# Patient Record
Sex: MALE | Race: OTHER | Employment: STUDENT | URBAN - NONMETROPOLITAN AREA
[De-identification: names, ages, dates, MRNs, and addresses within clinical notes are randomized per-mention and may not be internally consistent; named-entity substitution may affect disease eponyms.]

---

## 2022-09-05 ENCOUNTER — HOSPITAL ENCOUNTER (EMERGENCY)
Age: 22
Discharge: HOME | End: 2022-09-05
Payer: COMMERCIAL

## 2022-09-05 VITALS
HEART RATE: 85 BPM | WEIGHT: 130 LBS | RESPIRATION RATE: 14 BRPM | TEMPERATURE: 97.8 F | HEIGHT: 68 IN | OXYGEN SATURATION: 99 % | DIASTOLIC BLOOD PRESSURE: 82 MMHG | BODY MASS INDEX: 19.7 KG/M2 | SYSTOLIC BLOOD PRESSURE: 134 MMHG

## 2022-09-05 DIAGNOSIS — Z48.02 VISIT FOR SUTURE REMOVAL: Primary | ICD-10-CM

## 2022-09-05 PROCEDURE — 99203 OFFICE O/P NEW LOW 30 MIN: CPT

## 2022-09-05 PROCEDURE — 99202 OFFICE O/P NEW SF 15 MIN: CPT

## 2022-09-05 PROCEDURE — 99203 OFFICE O/P NEW LOW 30 MIN: CPT | Performed by: NURSE PRACTITIONER

## 2022-09-05 RX ORDER — ATOMOXETINE 40 MG/1
40 CAPSULE ORAL DAILY
COMMUNITY

## 2022-09-05 ASSESSMENT — PAIN SCALES - GENERAL: PAINLEVEL_OUTOF10: 1

## 2022-09-05 ASSESSMENT — ENCOUNTER SYMPTOMS
NAUSEA: 0
SHORTNESS OF BREATH: 0
VOMITING: 0
COUGH: 0
SORE THROAT: 0

## 2022-09-05 ASSESSMENT — PAIN DESCRIPTION - LOCATION: LOCATION: FINGER (COMMENT WHICH ONE)

## 2022-09-05 ASSESSMENT — PAIN - FUNCTIONAL ASSESSMENT: PAIN_FUNCTIONAL_ASSESSMENT: 0-10

## 2022-09-05 NOTE — ED TRIAGE NOTES
TO room 7 req Uest suture removel right ring finger.   Area scabbed, well approximated without redness or drainage

## 2022-09-05 NOTE — ED PROVIDER NOTES
[facility]       CHIEF COMPLAINT       Chief Complaint   Patient presents with    Suture / Staple Removal     Right ring finger- area scabbed hard to visualize  pt states td was not updated and unsure last td  Sutures placed last Monday [facility]       Nurses Notes reviewed and I agree except as noted in the HPI. HISTORY OF PRESENT ILLNESS   Jonah Harry is a 25 y.o. male who presents for removal of sutures to the right ring finger. Patient states that he had an accident while at work 1 week ago and was taken to [facility] and had sutures placed. He states that he did not file any Worker's Compensation for this. He denies any issues or concerns with the sutures other than scabs forming over the knots. Denies any fevers, chills, discharge or drainage. The history is provided by the patient. REVIEW OF SYSTEMS     Review of Systems   Constitutional:  Negative for chills and fever. HENT:  Negative for congestion and sore throat. Respiratory:  Negative for cough and shortness of breath. Cardiovascular:  Negative for chest pain. Gastrointestinal:  Negative for nausea and vomiting. Musculoskeletal:  Negative for arthralgias and joint swelling. Skin:  Positive for wound. Neurological:  Negative for headaches. PAST MEDICAL HISTORY         Diagnosis Date    ADHD        SURGICALHISTORY     Patient  has a past surgical history that includes Cosmetic surgery. CURRENT MEDICATIONS       Previous Medications    ATOMOXETINE (STRATTERA) 40 MG CAPSULE    Take 40 mg by mouth daily       ALLERGIES     Patient is has No Known Allergies. Patients   There is no immunization history on file for this patient. FAMILY HISTORY     Patient's family history is not on file. SOCIAL HISTORY     Patient  reports that he does not currently use alcohol. He reports that he does not currently use drugs.     PHYSICAL EXAM     ED TRIAGE VITALS  BP: 134/82, Temp: 97.8 °F (36.6 °C), Heart Rate: 85, Resp: 14, SpO2: 99 %,Estimated body mass index is 19.77 kg/m² as calculated from the following:    Height as of this encounter: 5' 8\" (1.727 m). Weight as of this encounter: 130 lb (59 kg). ,No LMP for male patient. Physical Exam  Vitals and nursing note reviewed. Constitutional:       General: He is not in acute distress. Appearance: He is well-developed. He is not diaphoretic. Eyes:      Conjunctiva/sclera:      Right eye: Right conjunctiva is not injected. Left eye: Left conjunctiva is not injected. Pupils: Pupils are equal.   Cardiovascular:      Rate and Rhythm: Normal rate and regular rhythm. Heart sounds: No murmur heard. Pulmonary:      Effort: Pulmonary effort is normal. No respiratory distress. Breath sounds: Normal breath sounds. Musculoskeletal:      Right hand: Laceration (resolved) present. Normal sensation. Normal capillary refill. Cervical back: Normal range of motion. Comments: Laceration with 5 sutures in place noted to the distal aspect of the right ring finger with scabbing over the wounds noted. There is no sign of redness or drainage. Skin:     General: Skin is warm. Findings: No rash. Neurological:      Mental Status: He is alert and oriented to person, place, and time. Psychiatric:         Behavior: Behavior normal.       DIAGNOSTIC RESULTS     Labs:No results found for this visit on 09/05/22. IMAGING:    No orders to display         EKG:      URGENT CARE COURSE:     Vitals:    09/05/22 1222   BP: 134/82   Pulse: 85   Resp: 14   Temp: 97.8 °F (36.6 °C)   SpO2: 99%   Weight: 130 lb (59 kg)   Height: 5' 8\" (1.727 m)       Medications - No data to display         PROCEDURES:  Suture removal kit was used to remove 5 sutures from the distal right ring finger. FINAL IMPRESSION      1.  Visit for suture removal          DISPOSITION/ PLAN       Sutures were removed and patient is advised on wound